# Patient Record
Sex: FEMALE | Race: BLACK OR AFRICAN AMERICAN
[De-identification: names, ages, dates, MRNs, and addresses within clinical notes are randomized per-mention and may not be internally consistent; named-entity substitution may affect disease eponyms.]

---

## 2019-02-05 ENCOUNTER — HOSPITAL ENCOUNTER (EMERGENCY)
Dept: HOSPITAL 54 - ER | Age: 54
Discharge: HOME | End: 2019-02-05
Payer: COMMERCIAL

## 2019-02-05 VITALS — DIASTOLIC BLOOD PRESSURE: 90 MMHG | SYSTOLIC BLOOD PRESSURE: 140 MMHG

## 2019-02-05 VITALS — BODY MASS INDEX: 37.8 KG/M2 | WEIGHT: 270 LBS | HEIGHT: 71 IN

## 2019-02-05 DIAGNOSIS — R09.1: Primary | ICD-10-CM

## 2019-02-05 DIAGNOSIS — I10: ICD-10-CM

## 2019-02-05 DIAGNOSIS — M19.90: ICD-10-CM

## 2019-02-05 LAB
ALBUMIN SERPL BCP-MCNC: 3.7 G/DL (ref 3.4–5)
ALP SERPL-CCNC: 81 U/L (ref 46–116)
ALT SERPL W P-5'-P-CCNC: 22 U/L (ref 12–78)
AST SERPL W P-5'-P-CCNC: 15 U/L (ref 15–37)
BASOPHILS # BLD AUTO: 0.1 /CMM (ref 0–0.2)
BASOPHILS NFR BLD AUTO: 1.5 % (ref 0–2)
BILIRUB DIRECT SERPL-MCNC: 0.1 MG/DL (ref 0–0.2)
BILIRUB SERPL-MCNC: 0.2 MG/DL (ref 0.2–1)
BUN SERPL-MCNC: 20 MG/DL (ref 7–18)
CALCIUM SERPL-MCNC: 9 MG/DL (ref 8.5–10.1)
CHLORIDE SERPL-SCNC: 109 MMOL/L (ref 98–107)
CO2 SERPL-SCNC: 29 MMOL/L (ref 21–32)
CREAT SERPL-MCNC: 1.1 MG/DL (ref 0.6–1.3)
D DIMER PPP FEU-MCNC: 0.45 MG/L(FEU (ref 0.17–0.5)
EOSINOPHIL NFR BLD AUTO: 3.4 % (ref 0–6)
GLUCOSE SERPL-MCNC: 96 MG/DL (ref 74–106)
HCT VFR BLD AUTO: 40 % (ref 33–45)
HGB BLD-MCNC: 13.3 G/DL (ref 11.5–14.8)
LYMPHOCYTES NFR BLD AUTO: 1.6 /CMM (ref 0.8–4.8)
LYMPHOCYTES NFR BLD AUTO: 20.5 % (ref 20–44)
MCHC RBC AUTO-ENTMCNC: 34 G/DL (ref 31–36)
MCV RBC AUTO: 91 FL (ref 82–100)
MONOCYTES NFR BLD AUTO: 0.5 /CMM (ref 0.1–1.3)
MONOCYTES NFR BLD AUTO: 6.5 % (ref 2–12)
NEUTROPHILS # BLD AUTO: 5.5 /CMM (ref 1.8–8.9)
NEUTROPHILS NFR BLD AUTO: 68.1 % (ref 43–81)
PLATELET # BLD AUTO: 209 /CMM (ref 150–450)
POTASSIUM SERPL-SCNC: 4.3 MMOL/L (ref 3.5–5.1)
PROT SERPL-MCNC: 7 G/DL (ref 6.4–8.2)
RBC # BLD AUTO: 4.35 MIL/UL (ref 4–5.2)
SODIUM SERPL-SCNC: 145 MMOL/L (ref 136–145)
WBC NRBC COR # BLD AUTO: 8 K/UL (ref 4.3–11)

## 2019-02-05 PROCEDURE — 36415 COLL VENOUS BLD VENIPUNCTURE: CPT

## 2019-02-05 PROCEDURE — 96375 TX/PRO/DX INJ NEW DRUG ADDON: CPT

## 2019-02-05 PROCEDURE — 71275 CT ANGIOGRAPHY CHEST: CPT

## 2019-02-05 PROCEDURE — 93005 ELECTROCARDIOGRAM TRACING: CPT

## 2019-02-05 PROCEDURE — 85730 THROMBOPLASTIN TIME PARTIAL: CPT

## 2019-02-05 PROCEDURE — 80048 BASIC METABOLIC PNL TOTAL CA: CPT

## 2019-02-05 PROCEDURE — 96374 THER/PROPH/DIAG INJ IV PUSH: CPT

## 2019-02-05 PROCEDURE — 84484 ASSAY OF TROPONIN QUANT: CPT

## 2019-02-05 PROCEDURE — 80076 HEPATIC FUNCTION PANEL: CPT

## 2019-02-05 PROCEDURE — 85378 FIBRIN DEGRADE SEMIQUANT: CPT

## 2019-02-05 PROCEDURE — 99284 EMERGENCY DEPT VISIT MOD MDM: CPT

## 2019-02-05 PROCEDURE — 85025 COMPLETE CBC W/AUTO DIFF WBC: CPT

## 2019-02-05 PROCEDURE — 71045 X-RAY EXAM CHEST 1 VIEW: CPT

## 2019-02-05 NOTE — NUR
PT [id number] C/O LL RIB PAIN x 2 WKS WORST TODAY, NON RADIATING. 9/10 PS, PT IS 
AAOX4, NOT IN RESPIRATORY DISTRESS, VS STABLE, KEPT RESTED AND COMFORTABLE.

## 2019-03-16 ENCOUNTER — HOSPITAL ENCOUNTER (EMERGENCY)
Dept: HOSPITAL 54 - ER | Age: 54
Discharge: HOME | End: 2019-03-16
Payer: COMMERCIAL

## 2019-03-16 VITALS — SYSTOLIC BLOOD PRESSURE: 162 MMHG | DIASTOLIC BLOOD PRESSURE: 105 MMHG

## 2019-03-16 VITALS — WEIGHT: 165 LBS | BODY MASS INDEX: 23.1 KG/M2 | HEIGHT: 71 IN

## 2019-03-16 DIAGNOSIS — I10: Primary | ICD-10-CM

## 2019-03-16 DIAGNOSIS — M19.90: ICD-10-CM

## 2019-03-16 DIAGNOSIS — M25.461: ICD-10-CM

## 2019-03-16 DIAGNOSIS — R51: ICD-10-CM

## 2019-03-16 LAB
ALBUMIN SERPL BCP-MCNC: 3.3 G/DL (ref 3.4–5)
ALP SERPL-CCNC: 65 U/L (ref 46–116)
ALT SERPL W P-5'-P-CCNC: 25 U/L (ref 12–78)
AST SERPL W P-5'-P-CCNC: 16 U/L (ref 15–37)
BASOPHILS # BLD AUTO: 0.1 /CMM (ref 0–0.2)
BASOPHILS NFR BLD AUTO: 1.1 % (ref 0–2)
BILIRUB DIRECT SERPL-MCNC: 0.1 MG/DL (ref 0–0.2)
BILIRUB SERPL-MCNC: 0.2 MG/DL (ref 0.2–1)
BUN SERPL-MCNC: 16 MG/DL (ref 7–18)
CALCIUM SERPL-MCNC: 9.4 MG/DL (ref 8.5–10.1)
CHLORIDE SERPL-SCNC: 103 MMOL/L (ref 98–107)
CO2 SERPL-SCNC: 31 MMOL/L (ref 21–32)
CREAT SERPL-MCNC: 1.1 MG/DL (ref 0.6–1.3)
EOSINOPHIL NFR BLD AUTO: 2.4 % (ref 0–6)
GLUCOSE SERPL-MCNC: 108 MG/DL (ref 74–106)
HCT VFR BLD AUTO: 41 % (ref 33–45)
HGB BLD-MCNC: 13.6 G/DL (ref 11.5–14.8)
LYMPHOCYTES NFR BLD AUTO: 2.1 /CMM (ref 0.8–4.8)
LYMPHOCYTES NFR BLD AUTO: 22.3 % (ref 20–44)
MCHC RBC AUTO-ENTMCNC: 33 G/DL (ref 31–36)
MCV RBC AUTO: 91 FL (ref 82–100)
MONOCYTES NFR BLD AUTO: 0.6 /CMM (ref 0.1–1.3)
MONOCYTES NFR BLD AUTO: 6.8 % (ref 2–12)
NEUTROPHILS # BLD AUTO: 6.4 /CMM (ref 1.8–8.9)
NEUTROPHILS NFR BLD AUTO: 67.4 % (ref 43–81)
PLATELET # BLD AUTO: 229 /CMM (ref 150–450)
POTASSIUM SERPL-SCNC: 3.6 MMOL/L (ref 3.5–5.1)
PROT SERPL-MCNC: 6.8 G/DL (ref 6.4–8.2)
RBC # BLD AUTO: 4.47 MIL/UL (ref 4–5.2)
SODIUM SERPL-SCNC: 138 MMOL/L (ref 136–145)
WBC NRBC COR # BLD AUTO: 9.5 K/UL (ref 4.3–11)

## 2019-03-16 PROCEDURE — 93005 ELECTROCARDIOGRAM TRACING: CPT

## 2019-03-16 PROCEDURE — 36415 COLL VENOUS BLD VENIPUNCTURE: CPT

## 2019-03-16 PROCEDURE — 80076 HEPATIC FUNCTION PANEL: CPT

## 2019-03-16 PROCEDURE — 73564 X-RAY EXAM KNEE 4 OR MORE: CPT

## 2019-03-16 PROCEDURE — 80048 BASIC METABOLIC PNL TOTAL CA: CPT

## 2019-03-16 PROCEDURE — 71045 X-RAY EXAM CHEST 1 VIEW: CPT

## 2019-03-16 PROCEDURE — 85025 COMPLETE CBC W/AUTO DIFF WBC: CPT

## 2019-03-16 PROCEDURE — 99284 EMERGENCY DEPT VISIT MOD MDM: CPT

## 2019-03-16 PROCEDURE — 96374 THER/PROPH/DIAG INJ IV PUSH: CPT

## 2019-03-16 PROCEDURE — 70450 CT HEAD/BRAIN W/O DYE: CPT

## 2019-10-03 ENCOUNTER — HOSPITAL ENCOUNTER (EMERGENCY)
Dept: HOSPITAL 54 - ER | Age: 54
Discharge: PSYCHIATRIC OR SUBSTANCE USE FACILITY | End: 2019-10-03
Payer: COMMERCIAL

## 2019-10-03 VITALS — HEIGHT: 71 IN | BODY MASS INDEX: 35.28 KG/M2 | WEIGHT: 252 LBS

## 2019-10-03 VITALS — SYSTOLIC BLOOD PRESSURE: 177 MMHG | DIASTOLIC BLOOD PRESSURE: 102 MMHG

## 2019-10-03 DIAGNOSIS — M19.90: ICD-10-CM

## 2019-10-03 DIAGNOSIS — I10: ICD-10-CM

## 2019-10-03 DIAGNOSIS — F32.9: Primary | ICD-10-CM

## 2019-10-03 LAB
ALBUMIN SERPL BCP-MCNC: 3.5 G/DL (ref 3.4–5)
ALP SERPL-CCNC: 68 U/L (ref 46–116)
ALT SERPL W P-5'-P-CCNC: 24 U/L (ref 12–78)
APAP SERPL-MCNC: 0 UG/ML (ref 10–30)
AST SERPL W P-5'-P-CCNC: 15 U/L (ref 15–37)
BASOPHILS # BLD AUTO: 0.1 /CMM (ref 0–0.2)
BASOPHILS NFR BLD AUTO: 1.2 % (ref 0–2)
BILIRUB DIRECT SERPL-MCNC: 0.1 MG/DL (ref 0–0.2)
BILIRUB SERPL-MCNC: 0.3 MG/DL (ref 0.2–1)
BUN SERPL-MCNC: 15 MG/DL (ref 7–18)
CALCIUM SERPL-MCNC: 9 MG/DL (ref 8.5–10.1)
CHLORIDE SERPL-SCNC: 108 MMOL/L (ref 98–107)
CO2 SERPL-SCNC: 28 MMOL/L (ref 21–32)
CREAT SERPL-MCNC: 0.8 MG/DL (ref 0.6–1.3)
EOSINOPHIL NFR BLD AUTO: 4.3 % (ref 0–6)
ETHANOL SERPL-MCNC: < 3 MG/DL (ref 0–0)
GLUCOSE SERPL-MCNC: 99 MG/DL (ref 74–106)
GLUCOSE UR STRIP-MCNC: (no result) MG/DL
HCT VFR BLD AUTO: 40 % (ref 33–45)
HGB BLD-MCNC: 13.2 G/DL (ref 11.5–14.8)
LYMPHOCYTES NFR BLD AUTO: 1.8 /CMM (ref 0.8–4.8)
LYMPHOCYTES NFR BLD AUTO: 20.9 % (ref 20–44)
MCHC RBC AUTO-ENTMCNC: 33 G/DL (ref 31–36)
MCV RBC AUTO: 93 FL (ref 82–100)
MONOCYTES NFR BLD AUTO: 0.6 /CMM (ref 0.1–1.3)
MONOCYTES NFR BLD AUTO: 7 % (ref 2–12)
NEUTROPHILS # BLD AUTO: 5.8 /CMM (ref 1.8–8.9)
NEUTROPHILS NFR BLD AUTO: 66.6 % (ref 43–81)
PH UR STRIP: 6.5 [PH] (ref 5–8)
PLATELET # BLD AUTO: 194 /CMM (ref 150–450)
POTASSIUM SERPL-SCNC: 3.9 MMOL/L (ref 3.5–5.1)
PROT SERPL-MCNC: 6.8 G/DL (ref 6.4–8.2)
RBC # BLD AUTO: 4.31 MIL/UL (ref 4–5.2)
RBC #/AREA URNS HPF: (no result) /HPF (ref 0–2)
SALICYLATES SERPL-MCNC: 4.1 MG/DL (ref 2.8–20)
SODIUM SERPL-SCNC: 142 MMOL/L (ref 136–145)
UROBILINOGEN UR STRIP-MCNC: 0.2 EU/DL
WBC #/AREA URNS HPF: (no result) /HPF (ref 0–3)
WBC NRBC COR # BLD AUTO: 8.7 K/UL (ref 4.3–11)

## 2019-10-03 PROCEDURE — 81001 URINALYSIS AUTO W/SCOPE: CPT

## 2019-10-03 PROCEDURE — 80305 DRUG TEST PRSMV DIR OPT OBS: CPT

## 2019-10-03 PROCEDURE — G0480 DRUG TEST DEF 1-7 CLASSES: HCPCS

## 2019-10-03 PROCEDURE — 80048 BASIC METABOLIC PNL TOTAL CA: CPT

## 2019-10-03 PROCEDURE — 85025 COMPLETE CBC W/AUTO DIFF WBC: CPT

## 2019-10-03 PROCEDURE — 84703 CHORIONIC GONADOTROPIN ASSAY: CPT

## 2019-10-03 PROCEDURE — 80307 DRUG TEST PRSMV CHEM ANLYZR: CPT

## 2019-10-03 PROCEDURE — 36415 COLL VENOUS BLD VENIPUNCTURE: CPT

## 2019-10-03 PROCEDURE — 99285 EMERGENCY DEPT VISIT HI MDM: CPT

## 2019-10-03 PROCEDURE — 80076 HEPATIC FUNCTION PANEL: CPT

## 2019-10-03 PROCEDURE — 80329 ANALGESICS NON-OPIOID 1 OR 2: CPT

## 2019-10-03 RX ADMIN — CAPTOPRIL ONE MG: 12.5 TABLET ORAL at 13:28

## 2019-10-03 RX ADMIN — CAPTOPRIL ONE MG: 12.5 TABLET ORAL at 14:18

## 2019-10-03 NOTE — NUR
ANGELINA faxed requested report to Morgan in intake and called him to confirm he received it. Morgan 
did receive the fax and informed ANGELINA he has forwarded the report to  [facility] charge SHANDA Díaz. 
He will contact CRIS Díaz and call ANGELINA back.

## 2019-10-03 NOTE — NUR
Social service consult requested by Dr. Mike for suicidal ideations with no plan at 
this time. Pt. is a 53 year old female with history of hypertension and depression referred 
here by her outpatient psychiatrist due to worsening depression and suicidal ideation. 
Reports she has been taking her medications as prescribed however she has had a series of 
life events recently that it made her more depressed and she is hearing voices telling her 
to kill herself. Reports that her ex- just  and that her kids were taken away 
from her. ANGELINA met with the pt. bedside. Pt. is alert and oriented x 4. Pt. has a sad affect. 
Pt. resides alone at [address] in [place]. Pt. sates she has diagnosis of 
Depression and Anxiety and takes medication. Pt. stats she is having suicidal thoughts and 
hearing voices telling her to kill herself. Pt. was hospitalized for suicidal ideations at 
[facility] 3 years ago. Pt. states he ex-  recently and her twin 17 
year old boys are in foster care for the past 3 years. Pt. is feeling very depressed and is 
requesting voluntary psychiatric hospitalization. 



ANGELINA contacted Rufino at [facility] [phone] requesting bed availability. Rufino informed ANGELINA 
to fax clinicals to [phone] and he will have a bed for the pt.

## 2019-10-03 NOTE — NUR
SW received a callback from [place] in intake requesting for a pregnancy test and latest blood 
pressure reading.

## 2019-10-03 NOTE — NUR
"C/O DEPRESSION X 1 MONTH. SI, NO ACTIVE PLAN AT THIS TIME." PT AAOX4, -SOB, 
NAD NOTED, VSS, PENDING MD HURLEY, SITTER AT BEDSIDE FOR SAFETY

## 2019-10-03 NOTE — NUR
ANGELINA received a callback from Morgan in intake at [facility]. Pt. has been accepted under psychiatrist 
Dr. Koenig and internist Dr. Echeverria. Report needs to be called in to [phone], charge 
SHANDA Morton. ANGELINA informed ED SHANDA Sullivan with aforementioned information.

## 2019-10-03 NOTE — NUR
PT LEFT [facility]IA [facility] TO [facility]; PT LEFT IN STABLE CONDITION, VSS, 
NAD NOTED. REPORT GIVEN TO AMB STAFF

## 2019-10-03 NOTE — NUR
ANGELINA received a call from Morgan at [facility] intake informing ANGELINA that as soon as he receives the 
clinicals, he will forward it to charge RN at [facility] and follow up with ANGELINA. ANGELINA informed Morgan 
that clinicals were faxed to [phone].

## 2019-11-10 ENCOUNTER — HOSPITAL ENCOUNTER (EMERGENCY)
Dept: HOSPITAL 54 - ER | Age: 54
Discharge: HOME | End: 2019-11-10
Payer: COMMERCIAL

## 2019-11-10 VITALS — SYSTOLIC BLOOD PRESSURE: 156 MMHG | DIASTOLIC BLOOD PRESSURE: 100 MMHG

## 2019-11-10 VITALS — BODY MASS INDEX: 36.4 KG/M2 | WEIGHT: 260 LBS | HEIGHT: 71 IN

## 2019-11-10 DIAGNOSIS — Z76.0: ICD-10-CM

## 2019-11-10 DIAGNOSIS — I50.9: ICD-10-CM

## 2019-11-10 DIAGNOSIS — Z79.899: ICD-10-CM

## 2019-11-10 DIAGNOSIS — J45.909: Primary | ICD-10-CM

## 2019-11-10 DIAGNOSIS — I11.0: ICD-10-CM

## 2019-11-10 DIAGNOSIS — Z86.711: ICD-10-CM

## 2019-11-10 DIAGNOSIS — Z86.718: ICD-10-CM

## 2019-11-10 PROCEDURE — 96374 THER/PROPH/DIAG INJ IV PUSH: CPT

## 2019-11-10 PROCEDURE — 99285 EMERGENCY DEPT VISIT HI MDM: CPT

## 2019-11-10 PROCEDURE — 94644 CONT INHLJ TX 1ST HOUR: CPT

## 2019-11-10 PROCEDURE — 96375 TX/PRO/DX INJ NEW DRUG ADDON: CPT

## 2019-11-10 PROCEDURE — 71045 X-RAY EXAM CHEST 1 VIEW: CPT

## 2019-11-10 PROCEDURE — 93005 ELECTROCARDIOGRAM TRACING: CPT

## 2019-11-10 NOTE — NUR
Patient heplock removed noted cath intact no edema ,no pain 

noted patient agrees to call pmd in 2 days verbalized understanding

## 2019-11-22 ENCOUNTER — HOSPITAL ENCOUNTER (EMERGENCY)
Dept: HOSPITAL 54 - ER | Age: 54
Discharge: HOME | End: 2019-11-22
Payer: COMMERCIAL

## 2019-11-22 VITALS — HEIGHT: 67 IN | WEIGHT: 259 LBS | BODY MASS INDEX: 40.65 KG/M2

## 2019-11-22 VITALS — SYSTOLIC BLOOD PRESSURE: 192 MMHG | DIASTOLIC BLOOD PRESSURE: 129 MMHG

## 2019-11-22 DIAGNOSIS — R06.02: ICD-10-CM

## 2019-11-22 DIAGNOSIS — M79.18: Primary | ICD-10-CM

## 2019-11-22 DIAGNOSIS — Z79.899: ICD-10-CM

## 2019-11-22 DIAGNOSIS — I10: ICD-10-CM

## 2019-11-22 DIAGNOSIS — E78.5: ICD-10-CM

## 2019-11-22 DIAGNOSIS — R07.81: ICD-10-CM

## 2019-11-22 DIAGNOSIS — F41.9: ICD-10-CM

## 2019-11-22 DIAGNOSIS — J45.909: ICD-10-CM

## 2019-11-22 DIAGNOSIS — F17.200: ICD-10-CM

## 2019-11-22 DIAGNOSIS — F32.9: ICD-10-CM

## 2020-08-17 ENCOUNTER — HOSPITAL ENCOUNTER (INPATIENT)
Dept: HOSPITAL 54 - ER | Age: 55
LOS: 2 days | Discharge: HOME HEALTH | DRG: 422 | End: 2020-08-19
Attending: INTERNAL MEDICINE | Admitting: INTERNAL MEDICINE
Payer: COMMERCIAL

## 2020-08-17 VITALS — BODY MASS INDEX: 33.27 KG/M2 | HEIGHT: 66 IN | WEIGHT: 207 LBS

## 2020-08-17 VITALS — DIASTOLIC BLOOD PRESSURE: 57 MMHG | SYSTOLIC BLOOD PRESSURE: 109 MMHG

## 2020-08-17 DIAGNOSIS — N13.9: ICD-10-CM

## 2020-08-17 DIAGNOSIS — S82.831A: ICD-10-CM

## 2020-08-17 DIAGNOSIS — N17.0: ICD-10-CM
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
## 2020-08-17 DIAGNOSIS — E86.0: ICD-10-CM

## 2020-08-17 DIAGNOSIS — M77.30: ICD-10-CM

## 2020-08-17 DIAGNOSIS — Z72.0: ICD-10-CM

## 2020-08-17 DIAGNOSIS — Z79.899: ICD-10-CM

## 2020-08-17 DIAGNOSIS — F32.9: ICD-10-CM

## 2020-08-17 DIAGNOSIS — J44.9: ICD-10-CM

## 2020-08-17 DIAGNOSIS — E86.9: Primary | ICD-10-CM
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
## 2020-08-17 DIAGNOSIS — I10: ICD-10-CM

## 2020-08-17 DIAGNOSIS — F41.9: ICD-10-CM

## 2020-08-17 DIAGNOSIS — M19.90: ICD-10-CM

## 2020-08-17 DIAGNOSIS — D72.829: ICD-10-CM
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
## 2020-08-17 LAB
ALBUMIN SERPL BCP-MCNC: 3.8 G/DL (ref 3.4–5)
ALP SERPL-CCNC: 66 U/L (ref 46–116)
ALT SERPL W P-5'-P-CCNC: 35 U/L (ref 12–78)
APAP SERPL-MCNC: < 2 UG/ML (ref 10–30)
APPEARANCE UR: (no result)
AST SERPL W P-5'-P-CCNC: 23 U/L (ref 15–37)
BASOPHILS # BLD AUTO: 0.1 /CMM (ref 0–0.2)
BASOPHILS NFR BLD AUTO: 1.1 % (ref 0–2)
BILIRUB DIRECT SERPL-MCNC: 0.1 MG/DL (ref 0–0.2)
BILIRUB SERPL-MCNC: 0.5 MG/DL (ref 0.2–1)
BILIRUB UR QL STRIP: (no result)
BUN SERPL-MCNC: 44 MG/DL (ref 7–18)
CALCIUM SERPL-MCNC: 9.8 MG/DL (ref 8.5–10.1)
CHLORIDE SERPL-SCNC: 100 MMOL/L (ref 98–107)
CHOLEST SERPL-MCNC: 125 MG/DL (ref ?–200)
CO2 SERPL-SCNC: 23 MMOL/L (ref 21–32)
COLOR UR: YELLOW
CREAT SERPL-MCNC: 8.7 MG/DL (ref 0.6–1.3)
EOSINOPHIL NFR BLD AUTO: 1.2 % (ref 0–6)
ETHANOL SERPL-MCNC: < 3 MG/DL (ref 0–0)
GLUCOSE SERPL-MCNC: 104 MG/DL (ref 74–106)
GLUCOSE UR STRIP-MCNC: NEGATIVE MG/DL
HCT VFR BLD AUTO: 37 % (ref 33–45)
HDLC SERPL-MCNC: 45 MG/DL (ref 40–60)
HGB BLD-MCNC: 12.2 G/DL (ref 11.5–14.8)
HGB UR QL STRIP: (no result) ERY/UL
IRON SERPL-MCNC: 39 UG/DL (ref 50–175)
KETONES UR STRIP-MCNC: (no result) MG/DL
LDLC SERPL DIRECT ASSAY-MCNC: 52 MG/DL (ref 0–99)
LEUKOCYTE ESTERASE UR QL STRIP: NEGATIVE
LYMPHOCYTES NFR BLD AUTO: 1.7 /CMM (ref 0.8–4.8)
LYMPHOCYTES NFR BLD AUTO: 13.2 % (ref 20–44)
MAGNESIUM SERPL-MCNC: 2.1 MG/DL (ref 1.8–2.4)
MAGNESIUM SERPL-MCNC: 2.2 MG/DL (ref 1.8–2.4)
MCHC RBC AUTO-ENTMCNC: 33 G/DL (ref 31–36)
MCV RBC AUTO: 93 FL (ref 82–100)
MONOCYTES NFR BLD AUTO: 1 /CMM (ref 0.1–1.3)
MONOCYTES NFR BLD AUTO: 8.1 % (ref 2–12)
NEUTROPHILS # BLD AUTO: 9.9 /CMM (ref 1.8–8.9)
NEUTROPHILS NFR BLD AUTO: 76.4 % (ref 43–81)
NITRITE UR QL STRIP: NEGATIVE
NT-PROBNP SERPL-MCNC: 458 PG/ML (ref 0–125)
PH UR STRIP: 5.5 [PH] (ref 5–8)
PHOSPHATE SERPL-MCNC: 5.3 MG/DL (ref 2.5–4.9)
PLATELET # BLD AUTO: 209 /CMM (ref 150–450)
POTASSIUM SERPL-SCNC: 4 MMOL/L (ref 3.5–5.1)
PROT SERPL-MCNC: 9.6 G/DL (ref 6.4–8.2)
PROT UR QL STRIP: (no result) MG/DL
RBC # BLD AUTO: 4 MIL/UL (ref 4–5.2)
RBC #/AREA URNS HPF: (no result) /HPF (ref 0–2)
SALICYLATES SERPL-MCNC: 6.9 MG/DL (ref 2.8–20)
SODIUM SERPL-SCNC: 136 MMOL/L (ref 136–145)
TIBC SERPL-MCNC: 278 UG/DL (ref 250–450)
TRIGL SERPL-MCNC: 135 MG/DL (ref 30–150)
TSH SERPL DL<=0.005 MIU/L-ACNC: 0.28 UIU/ML (ref 0.36–3.74)
UROBILINOGEN UR STRIP-MCNC: 0.2 EU/DL
WBC #/AREA URNS HPF: (no result) /HPF (ref 0–3)
WBC NRBC COR # BLD AUTO: 12.9 K/UL (ref 4.3–11)

## 2020-08-17 PROCEDURE — G0480 DRUG TEST DEF 1-7 CLASSES: HCPCS

## 2020-08-17 PROCEDURE — G0378 HOSPITAL OBSERVATION PER HR: HCPCS

## 2020-08-17 RX ADMIN — Medication PRN EACH: at 12:02

## 2020-08-17 RX ADMIN — Medication PRN EACH: at 20:43

## 2020-08-17 RX ADMIN — SODIUM CHLORIDE PRN MLS/HR: 9 INJECTION, SOLUTION INTRAVENOUS at 12:06

## 2020-08-17 NOTE — NUR
TELE/RN  NOTE



THE PATIENT IS ALERT AND ORIENTED X4. IN ROOM AIR AND SATURATION IS AT 97%. DENIES SOB. 
RESPIRATION REGULAR AND UNLABORED. DENIES PAIN. THE PATIENT IS IN NO APPARENT DISTRESS. LEFT 
WRIST G 18 PATENT AN SALINE LOCKED. RIGHT WRIST G 18 PATENT AND NS INFUSING AT 100ML/HR. NO 
S/S INFILTRATION NOTED EXTERNAL TELE BOX READING IS SRDamon JOSÉEY CATH PRESENT AND NOTED CLEAR, 
YELLOW COLOR URINE. NO BLADDER DISTENSION NOTED. BED LOW AND LOCKED. SIDE RAILS UP X3. WILL 
ENDORSE TO NIGHT SHIFT.

-------------------------------------------------------------------------------

Addendum: 08/17/20 at 1832 by JEFFREY MONTGOMERY RN

-------------------------------------------------------------------------------

RN  NOTE



NOTED RIGHT ANKLE WITH SHORT LEFT SPLINT. DENIES PAIN. ABLE TO MOVE TOES FREELY. TOES WARN 
TO TOUCH. DENIES NUMBNESS, TINGLING.

## 2020-08-17 NOTE — NUR
TELE/RN  NOTE



THE PATIENT IS RECEIVED ON A GURNEY. ASSISTED THE PATIENT TO BED. THE PATIENT IS ALERT AND 
ORIENTED X4. DENIES SOB. RESPIRATION REGULAR AND UNLABORED. THE PATIENT IS IN ROOM AIR. 
NOTED RIGHT ANKLE WITH SHORT LEFT SPLINT. DENIES PAIN. ABLE TO MOVE TOES FREELY. TOES WARN 
TO TOUCH. DENIES NUMBNESS, TINGLING. LEWIS CATH PRESENT. LEFT WRIST G 18 AND RIGHT WRIST G 
18 BOTH PATENT AND SALINE LOCKED. BED LOW AND LOCKED. SIDE RAILS UP X3. CALL LIGHT WITHIN 
REACH. WILL CONTINUE TO MONITOR.

## 2020-08-17 NOTE — NUR
pt noted w/ swollen r ankle and c/o pain. reported fall three days ago.MD made 
aware w/ an order for R ankle x.ray

## 2020-08-17 NOTE — NUR
TELE/RN   NOTE



THE PATIENT REFUSED ORTHOSTATIC BLOOD PRESSURE TO BE CHECKED DESPITE EXPLAINING RISKS AND 
BENEFITS.

## 2020-08-17 NOTE — NUR
R ANKLE POSTERIOR SHORT LEG SPLINT APPLIED BY EMT, R LEG ELEVATED , KEPT PT 
COMFORTABLE .  WILL CONT TO MONITOR

## 2020-08-17 NOTE — NUR
Patient transferred to room 325-2 via acls protocol, endorsed to Krystian LAND. 
Patient in stable condition.

## 2020-08-17 NOTE — NUR
TELE/RN  NOTES



RECEIVED PATIENT  ALERT AND ORIENTED X4. IN ROOM AIR AND SATURATION IS AT 98%. DENIES 
SOB.BREATHING EVEN AND UNLABORED,  REGULAR AND UNLABORED. DENIES PAIN. THE PATIENT IS IN NO 
APPARENT DISTRESS. LEFT WRIST G 18 PATENT AN SALINE LOCKED. RIGHT WRIST G 18 PATENT AND NS 
INFUSING AT 100ML/HR. NO S/S INFILTRATION NOTED EXTERNAL TELE BOX READING IS SR 70s. LEWIS 
CATH INTACT AND PATENT DRAINING TO A YELLOW COLOR URINE. NO BLADDER DISTENSION NOTED. BED IN 
LOW AND LOCKED. CALL LIGHT WITHIN EASY REACH.SIDE RAILS UP X3. WILL ENDORSE TO AM SHIFT FOR 
CONTINUITY OF CARE.

## 2020-08-17 NOTE — NUR
mabry cath insserted w/ >50ml urine out put. clear, yellow

urine collected and sent to lab

covid swab collected and sent to lab

## 2020-08-18 VITALS — SYSTOLIC BLOOD PRESSURE: 123 MMHG | DIASTOLIC BLOOD PRESSURE: 82 MMHG

## 2020-08-18 VITALS — SYSTOLIC BLOOD PRESSURE: 119 MMHG | DIASTOLIC BLOOD PRESSURE: 84 MMHG

## 2020-08-18 VITALS — DIASTOLIC BLOOD PRESSURE: 75 MMHG | SYSTOLIC BLOOD PRESSURE: 111 MMHG

## 2020-08-18 VITALS — DIASTOLIC BLOOD PRESSURE: 75 MMHG | SYSTOLIC BLOOD PRESSURE: 117 MMHG

## 2020-08-18 VITALS — SYSTOLIC BLOOD PRESSURE: 124 MMHG | DIASTOLIC BLOOD PRESSURE: 80 MMHG

## 2020-08-18 VITALS — SYSTOLIC BLOOD PRESSURE: 123 MMHG | DIASTOLIC BLOOD PRESSURE: 90 MMHG

## 2020-08-18 VITALS — SYSTOLIC BLOOD PRESSURE: 123 MMHG | DIASTOLIC BLOOD PRESSURE: 88 MMHG

## 2020-08-18 LAB
ALBUMIN SERPL BCP-MCNC: 3 G/DL (ref 3.4–5)
ALP SERPL-CCNC: 54 U/L (ref 46–116)
ALT SERPL W P-5'-P-CCNC: 24 U/L (ref 12–78)
APPEARANCE UR: CLEAR
AST SERPL W P-5'-P-CCNC: 19 U/L (ref 15–37)
BASOPHILS # BLD AUTO: 0.1 /CMM (ref 0–0.2)
BASOPHILS NFR BLD AUTO: 1 % (ref 0–2)
BILIRUB SERPL-MCNC: 0.3 MG/DL (ref 0.2–1)
BILIRUB UR QL STRIP: NEGATIVE
BUN SERPL-MCNC: 44 MG/DL (ref 7–18)
CALCIUM SERPL-MCNC: 8.9 MG/DL (ref 8.5–10.1)
CHLORIDE SERPL-SCNC: 108 MMOL/L (ref 98–107)
CK SERPL-CCNC: 352 U/L (ref 26–192)
CO2 SERPL-SCNC: 20 MMOL/L (ref 21–32)
COLOR UR: YELLOW
CREAT SERPL-MCNC: 3.4 MG/DL (ref 0.6–1.3)
CREAT UR-MCNC: 255.4 MG/DL (ref 30–125)
EOSINOPHIL NFR BLD AUTO: 4.2 % (ref 0–6)
GLUCOSE SERPL-MCNC: 92 MG/DL (ref 74–106)
GLUCOSE UR STRIP-MCNC: NEGATIVE MG/DL
HCT VFR BLD AUTO: 35 % (ref 33–45)
HGB BLD-MCNC: 11.4 G/DL (ref 11.5–14.8)
HGB UR QL STRIP: (no result) ERY/UL
KETONES UR STRIP-MCNC: NEGATIVE MG/DL
LEUKOCYTE ESTERASE UR QL STRIP: NEGATIVE
LYMPHOCYTES NFR BLD AUTO: 1.9 /CMM (ref 0.8–4.8)
LYMPHOCYTES NFR BLD AUTO: 25.7 % (ref 20–44)
MAGNESIUM SERPL-MCNC: 2 MG/DL (ref 1.8–2.4)
MCHC RBC AUTO-ENTMCNC: 33 G/DL (ref 31–36)
MCV RBC AUTO: 94 FL (ref 82–100)
MONOCYTES NFR BLD AUTO: 0.7 /CMM (ref 0.1–1.3)
MONOCYTES NFR BLD AUTO: 9.4 % (ref 2–12)
NEUTROPHILS # BLD AUTO: 4.5 /CMM (ref 1.8–8.9)
NEUTROPHILS NFR BLD AUTO: 59.7 % (ref 43–81)
NITRITE UR QL STRIP: NEGATIVE
PH UR STRIP: 6 [PH] (ref 5–8)
PHOSPHATE SERPL-MCNC: 4 MG/DL (ref 2.5–4.9)
PLATELET # BLD AUTO: 187 /CMM (ref 150–450)
POTASSIUM SERPL-SCNC: 3.5 MMOL/L (ref 3.5–5.1)
PROT SERPL-MCNC: 6.4 G/DL (ref 6.4–8.2)
PROT UR QL STRIP: NEGATIVE MG/DL
PROT UR-MCNC: 43.9 MG/DL (ref 0–11.9)
RBC # BLD AUTO: 3.68 MIL/UL (ref 4–5.2)
SODIUM SERPL-SCNC: 140 MMOL/L (ref 136–145)
SODIUM UR-SCNC: 47 MMOL/L (ref 40–220)
UROBILINOGEN UR STRIP-MCNC: 0.2 EU/DL
WBC NRBC COR # BLD AUTO: 7.5 K/UL (ref 4.3–11)

## 2020-08-18 RX ADMIN — LABETALOL HCL SCH MG: 100 TABLET, FILM COATED ORAL at 21:26

## 2020-08-18 RX ADMIN — SODIUM CHLORIDE PRN MLS/HR: 9 INJECTION, SOLUTION INTRAVENOUS at 03:17

## 2020-08-18 RX ADMIN — Medication SCH MG: at 21:02

## 2020-08-18 RX ADMIN — Medication PRN EACH: at 19:35

## 2020-08-18 RX ADMIN — Medication PRN EACH: at 12:11

## 2020-08-18 RX ADMIN — BACLOFEN SCH MG: 10 TABLET ORAL at 17:21

## 2020-08-18 RX ADMIN — Medication PRN EACH: at 05:44

## 2020-08-18 RX ADMIN — SODIUM CHLORIDE PRN MLS/HR: 9 INJECTION, SOLUTION INTRAVENOUS at 12:16

## 2020-08-18 RX ADMIN — GABAPENTIN SCH MG: 300 CAPSULE ORAL at 17:21

## 2020-08-18 RX ADMIN — CLONIDINE HYDROCHLORIDE SCH MG: 0.1 TABLET ORAL at 17:22

## 2020-08-18 RX ADMIN — Medication SCH MG: at 14:02

## 2020-08-18 RX ADMIN — BENZONATATE SCH MG: 100 CAPSULE ORAL at 12:05

## 2020-08-18 RX ADMIN — BENZONATATE SCH MG: 100 CAPSULE ORAL at 17:21

## 2020-08-18 RX ADMIN — ESCITALOPRAM OXALATE SCH MG: 10 TABLET, FILM COATED ORAL at 12:05

## 2020-08-18 NOTE — NUR
TELE RN NOTES



RECEIVED T IN BED, AWAKE, A/OX4. PT TOLERATING RA, WITH NO ACUTE RESPIRATORY DISTRESS NOTED. 
ON TELEMONITORING SR 74. PT DENIES ANY PAIN OR DISCOMFORT AS WELL. PT CONCERNED ABOUT 
BENZONATATE TID AT HOME AND FORGOT TO INCLUDE IN HER LIST OF MEDS HERE. IVF  ML/HR TO 
RFA G22, INTACT AND FLUID INFUSING WELL. PT KEPT COMFORTABLE IN BED. CALL LIGHT KEPT WITHIN 
REACH. PT'S BED IN LOWEST, LOCKED POSITION WITH SRX3. WILL CONTINUE PLAN OF CARE.

## 2020-08-18 NOTE — NUR
TELE RN NOTES



INFORMED HOSPITALIST/SA REGARDING MED RECON. PT MADE AWARE AS WELL. AWAITING FOR MED RECON 
TO BE RECONCILED. WILL CONTINUE TO MONITOR.

## 2020-08-18 NOTE — NUR
MS RN NOTES



PT REMAINS IN BED, AWAKE, A/OX4. PT TOLERATING RA, WITH NO ACUTE RESPIRATORY DISTRESS NOTED 
PT DENIES ANY PAIN OR DISCOMFORT AS WELL. IVF  ML/HR TO RFA G22, INTACT AND FLUID 
INFUSING WELL. ALL NEEDS AND CARE ATTENDED. PT KEPT COMFORTABLE IN BED. CALL LIGHT KEPT 
WITHIN REACH. PT'S BED IN LOWEST, LOCKED POSITION WITH SRX3. WILL ENDORSE TO INCOMING NIGHT 
NURSE FOR ELENI.

## 2020-08-18 NOTE — NUR
MS/RN OPENING NOTES 



RECEIVED IN BED RESTING WATCHING TV. PATIENT IS ALERT AND ORIENTED X 4. PATIENT IS ON ROOM 
AIR TOLERATING WELL, 95%. NO SINGS OF SOB OR RESPIRATORY DISTRESS NOTED. BREATHING IS EVEN 
AND UNLABORED. PATIENT HAS LEWIS CATH IN PLACE DRAINING CLEAR YELLOW URINE. IV ACCESS ON 
RIGHT FORE ARM #22 G RUNNING NS  ML/HR. PATIENT HAS RIGHT ANKLE SPLINT IN PLACE, 
DRESSING CLEAN AND INTACT EXTREMITY CIRCULATION GOOD. SAFETY MEASURES ARE IN PLACE, BED IS 
LOCKED AND PLACED IN THE LOWEST POSITION, SIDE RAILS UP X2. CALL LIGHT IS WITHIN REACH. WILL 
CONTINUE TO MONITOR PATIENT THROUGH OUT SHIFT.

## 2020-08-18 NOTE — NUR
MS/RN NOTES 



PATIENT STATED PAIN AT RIGHT LOWER EXTREMITY, REQUESTING FOR MEDICATION TO HELP. V/S ARE 
WITHIN NORMAL LIMITS. NORCO 5 MG PO WAS GIVEN. WILL CONTINUE TO MONITOR.

## 2020-08-18 NOTE — NUR
RN NOTES



ALL NEEDS ATTENDED AND MET ABLE TO REST AND SLEPT AT INTERVALS, REPOSITIONED FOR COMFORT, RE 
INSERTED A NEW PERIPHERAL IV ACCESS INTACT AND PATENT ON HER RIGHT FOREARM. DENIES ANY PAIN 
OR DISCOMFORT AT THIS TIME, SAFETY MEASURES IN PLACE, ASPIRATION PRECAUTION EMPHASIZED, 
RIGHT ANKLE SPLINT IN PLACED, CALL LIGHT WITHIN EASY REACH, ENDORSED TO AM NURSE FOR 
CONTINUITY OF CARE.

## 2020-08-19 VITALS — DIASTOLIC BLOOD PRESSURE: 84 MMHG | SYSTOLIC BLOOD PRESSURE: 136 MMHG

## 2020-08-19 VITALS — SYSTOLIC BLOOD PRESSURE: 133 MMHG | DIASTOLIC BLOOD PRESSURE: 97 MMHG

## 2020-08-19 LAB
ALBUMIN SERPL BCP-MCNC: 2.7 G/DL (ref 3.4–5)
ALBUMIN SERPL ELPH-MCNC: 2.9 G/DL (ref 2.9–4.4)
ALBUMIN/GLOB SERPL: 1 {RATIO} (ref 0.7–1.7)
ALP SERPL-CCNC: 50 U/L (ref 46–116)
ALPHA1 GLOB SERPL ELPH-MCNC: 0.3 G/DL (ref 0–0.4)
ALPHA2 GLOB SERPL ELPH-MCNC: 0.8 G/DL (ref 0.4–1)
ALT SERPL W P-5'-P-CCNC: 18 U/L (ref 12–78)
AST SERPL W P-5'-P-CCNC: 14 U/L (ref 15–37)
B-GLOBULIN SERPL ELPH-MCNC: 1 G/DL (ref 0.7–1.3)
BASOPHILS # BLD AUTO: 0 /CMM (ref 0–0.2)
BASOPHILS NFR BLD AUTO: 0.8 % (ref 0–2)
BILIRUB SERPL-MCNC: 0.3 MG/DL (ref 0.2–1)
BUN SERPL-MCNC: 31 MG/DL (ref 7–18)
CALCIUM SERPL-MCNC: 8.5 MG/DL (ref 8.5–10.1)
CHLORIDE SERPL-SCNC: 110 MMOL/L (ref 98–107)
CO2 SERPL-SCNC: 21 MMOL/L (ref 21–32)
CREAT SERPL-MCNC: 1.3 MG/DL (ref 0.6–1.3)
EOSINOPHIL NFR BLD AUTO: 3.7 % (ref 0–6)
GAMMA GLOB SERPL ELPH-MCNC: 0.9 G/DL (ref 0.4–1.8)
GLOBULIN SER CALC-MCNC: 2.9 G/DL (ref 2.2–3.9)
GLUCOSE SERPL-MCNC: 91 MG/DL (ref 74–106)
HCT VFR BLD AUTO: 31 % (ref 33–45)
HGB BLD-MCNC: 10 G/DL (ref 11.5–14.8)
LYMPHOCYTES NFR BLD AUTO: 1.6 /CMM (ref 0.8–4.8)
LYMPHOCYTES NFR BLD AUTO: 24.6 % (ref 20–44)
MAGNESIUM SERPL-MCNC: 1.9 MG/DL (ref 1.8–2.4)
MCHC RBC AUTO-ENTMCNC: 33 G/DL (ref 31–36)
MCV RBC AUTO: 94 FL (ref 82–100)
MONOCYTES NFR BLD AUTO: 0.5 /CMM (ref 0.1–1.3)
MONOCYTES NFR BLD AUTO: 8.2 % (ref 2–12)
NEUTROPHILS # BLD AUTO: 4.1 /CMM (ref 1.8–8.9)
NEUTROPHILS NFR BLD AUTO: 62.7 % (ref 43–81)
PHOSPHATE SERPL-MCNC: 3 MG/DL (ref 2.5–4.9)
PLATELET # BLD AUTO: 189 /CMM (ref 150–450)
POTASSIUM SERPL-SCNC: 3.6 MMOL/L (ref 3.5–5.1)
PROT SERPL-MCNC: 5.8 G/DL (ref 6.4–8.2)
PTH-INTACT SERPL-MCNC: 38 PG/ML (ref 15–65)
RBC # BLD AUTO: 3.27 MIL/UL (ref 4–5.2)
SODIUM SERPL-SCNC: 141 MMOL/L (ref 136–145)
WBC NRBC COR # BLD AUTO: 6.5 K/UL (ref 4.3–11)

## 2020-08-19 RX ADMIN — Medication SCH MG: at 13:30

## 2020-08-19 RX ADMIN — Medication PRN EACH: at 04:01

## 2020-08-19 RX ADMIN — Medication PRN EACH: at 13:09

## 2020-08-19 RX ADMIN — BENZONATATE SCH MG: 100 CAPSULE ORAL at 08:54

## 2020-08-19 RX ADMIN — SODIUM CHLORIDE PRN MLS/HR: 9 INJECTION, SOLUTION INTRAVENOUS at 00:05

## 2020-08-19 RX ADMIN — GABAPENTIN SCH MG: 300 CAPSULE ORAL at 08:54

## 2020-08-19 RX ADMIN — BACLOFEN SCH MG: 10 TABLET ORAL at 08:54

## 2020-08-19 RX ADMIN — CLONIDINE HYDROCHLORIDE SCH MG: 0.1 TABLET ORAL at 08:54

## 2020-08-19 RX ADMIN — Medication SCH MG: at 07:35

## 2020-08-19 RX ADMIN — BENZONATATE SCH MG: 100 CAPSULE ORAL at 13:09

## 2020-08-19 RX ADMIN — ESCITALOPRAM OXALATE SCH MG: 10 TABLET, FILM COATED ORAL at 08:53

## 2020-08-19 RX ADMIN — Medication SCH MG: at 01:48

## 2020-08-19 RX ADMIN — LABETALOL HCL SCH MG: 100 TABLET, FILM COATED ORAL at 08:53

## 2020-08-19 NOTE — NUR
MS/RN CLOSING NOTES 



PATIENT IS RESTING IN BED. PATIENT IS ALERT AND ORIENTED X 4. PATIENT IS ON ROOM AIR 
TOLERATING WELL, 98%. NO SINGS OF SOB OR RESPIRATORY DISTRESS NOTED. BREATHING IS EVEN AND 
UNLABORED. PATIENT HAS LEWIS CATH IN PLACE DRAINING CLEAR YELLOW URINE OUTPUT 550CC. IV 
ACCESS ON RIGHT FORE ARM #22 G RUNNING NS  ML/HR. PATIENT HAS RIGHT ANKLE SPLINT IN 
PLACE, DRESSING CLEAN AND INTACT EXTREMITY CIRCULATION GOOD. ALL PATIENTS NEEDS HAVE BEEN 
MET DURING SHIFT. SAFETY MEASURES ARE IN PLACE, BED IS LOCKED AND PLACED IN THE LOWEST 
POSITION, SIDE RAILS UP X2. CALL LIGHT IS WITHIN REACH. WILL ENDORSE CARE TO DAY SHIFT.

## 2020-08-19 NOTE — NUR
PT REFUSED RESP TX ATT, STATED SHE IS GOING HOME SOON. NO S/S OF SOB NOTED. BENEFITS AND 
CONTRAINDICATIONS EXPLAINED. WILL CONT TO MONITOR

-------------------------------------------------------------------------------

Addendum: 08/19/20 at 1433 by ALESIA BELL RT

-------------------------------------------------------------------------------

Amended: Links added.

## 2020-08-19 NOTE — NUR
PT REFUSED RESP TX ATT. NO S/S OF SOB NOTED. BENEFITS AND CONTRAINDICATIONS EXPLAINED. WILL 
CONT TO MONITOR

-------------------------------------------------------------------------------

Addendum: 08/19/20 at 0752 by ALESIA BELL RT

-------------------------------------------------------------------------------

Amended: Links added.

## 2020-08-19 NOTE — NUR
MS RN NOTES



RECEIVED PATIENT IN BED, ASLEEP. AROUSBALE TO VERBAL AND TACTILE STIMULI. HOB ELEVATED. 
ALERT AND ORIENTED X4. NO SOB. DENIES ANY C/O PAIN NOR DISCOMFORT AT THIS TIME. RIGHT LOWER 
LEG DRESSING IN PLACE. PATIENT ABLE TO MOVE TOES. SKIN WARM TO TOUCH WITHOUT C/O PAIN NOR 
DISCOMFORT ON RT LOWER LEG. RIGHT FA # 22 INTACT AND PATENT INFUSING NS  ML/HR KAILEY 
WELL. BED IN LOWEST POSITION, LOCKED. BED ALARM ON. CALL LIGHT WITHIN REACH. ABLE TO 
VERBALIZE NEEDS.

## 2020-08-19 NOTE — NUR
MS RN NOTES



PATIENT FOR DISCHARGE. DISCHARGE INSTRUCTIONS AND PACKET GIVEN TO PATIENT. IV ACCESS REMOVED 
WITH CATHETER TIP INTACT. ALL BELONGINGS ACCOUNTED FOR. PER CM, [facility] WILL LOOK FOR HOME 
HEALTH AND PATIENT IS AWARE. REITERATED TO PATIENT NON-WEIGHT BEARING TO RT LEG.  NO S/S OF 
RESPIRATORY DISTRESS. DENIES ANY C/O PAIN NOR DISCOMFORT AT THIS TIME. PATIENT PICKED UP BY 
AMBULANCE ACCOMPANIED BY 2 EMT. PATIENT LEFT IN STABLE CONDITION.

## 2021-03-19 ENCOUNTER — HOSPITAL ENCOUNTER (EMERGENCY)
Dept: HOSPITAL 54 - ER | Age: 56
Discharge: HOME | End: 2021-03-19
Payer: COMMERCIAL

## 2021-03-19 VITALS — WEIGHT: 198 LBS | HEIGHT: 71 IN | BODY MASS INDEX: 27.72 KG/M2

## 2021-03-19 VITALS — SYSTOLIC BLOOD PRESSURE: 102 MMHG | DIASTOLIC BLOOD PRESSURE: 66 MMHG

## 2021-03-19 DIAGNOSIS — F17.200: ICD-10-CM

## 2021-03-19 DIAGNOSIS — Z79.899: ICD-10-CM

## 2021-03-19 DIAGNOSIS — L02.415: Primary | ICD-10-CM

## 2021-03-19 DIAGNOSIS — M19.90: ICD-10-CM

## 2021-03-19 DIAGNOSIS — I10: ICD-10-CM

## 2021-03-19 NOTE — NUR
[id number] home c/o groin pain x today. c/o dizziness pta. low b/p per ems. 
patient a/ox4, breathing even and unlabored, no sob noted, needs attended. Kept 
comfortable in bed.

## 2021-09-06 ENCOUNTER — HOSPITAL ENCOUNTER (EMERGENCY)
Dept: HOSPITAL 54 - ER | Age: 56
Discharge: HOME | End: 2021-09-06
Payer: COMMERCIAL

## 2021-09-06 VITALS — BODY MASS INDEX: 28 KG/M2 | WEIGHT: 200 LBS | HEIGHT: 71 IN

## 2021-09-06 VITALS — DIASTOLIC BLOOD PRESSURE: 77 MMHG | SYSTOLIC BLOOD PRESSURE: 109 MMHG
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
## 2021-09-06 DIAGNOSIS — Z79.899: ICD-10-CM
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
## 2021-09-06 DIAGNOSIS — F17.200: ICD-10-CM
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
## 2021-09-06 DIAGNOSIS — I10: ICD-10-CM
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
## 2021-09-06 DIAGNOSIS — Z98.890: ICD-10-CM

## 2021-09-06 DIAGNOSIS — S13.4XXA: Primary | ICD-10-CM

## 2021-09-06 DIAGNOSIS — J44.9: ICD-10-CM

## 2021-09-06 DIAGNOSIS — Z60.2: ICD-10-CM

## 2021-09-06 SDOH — SOCIAL STABILITY - SOCIAL INSECURITY: PROBLEMS RELATED TO LIVING ALONE: Z60.2

## 2021-09-10 ENCOUNTER — HOSPITAL ENCOUNTER (EMERGENCY)
Dept: HOSPITAL 54 - ER | Age: 56
Discharge: ANOTHER HOSPITAL | End: 2021-09-10
Payer: COMMERCIAL

## 2021-09-10 VITALS — HEIGHT: 68 IN | BODY MASS INDEX: 25.01 KG/M2 | WEIGHT: 165 LBS

## 2021-09-10 VITALS — SYSTOLIC BLOOD PRESSURE: 126 MMHG | DIASTOLIC BLOOD PRESSURE: 89 MMHG

## 2021-09-10 DIAGNOSIS — I10: ICD-10-CM

## 2021-09-10 DIAGNOSIS — R94.31: ICD-10-CM

## 2021-09-10 DIAGNOSIS — R41.82: ICD-10-CM

## 2021-09-10 DIAGNOSIS — M62.82: Primary | ICD-10-CM

## 2021-09-10 DIAGNOSIS — D72.829: ICD-10-CM

## 2021-09-10 DIAGNOSIS — N17.9: ICD-10-CM

## 2021-09-10 DIAGNOSIS — Z20.822: ICD-10-CM

## 2021-09-10 DIAGNOSIS — J44.9: ICD-10-CM

## 2021-09-10 DIAGNOSIS — R00.0: ICD-10-CM

## 2021-09-10 DIAGNOSIS — E87.0: ICD-10-CM

## 2021-09-10 DIAGNOSIS — M17.0: ICD-10-CM

## 2021-09-10 DIAGNOSIS — F14.10: ICD-10-CM

## 2021-09-10 LAB
ALBUMIN SERPL BCP-MCNC: 3.5 G/DL (ref 3.4–5)
ALP SERPL-CCNC: 72 U/L (ref 46–116)
ALT SERPL W P-5'-P-CCNC: 27 U/L (ref 12–78)
APAP SERPL-MCNC: 0 UG/ML (ref 10–30)
AST SERPL W P-5'-P-CCNC: 63 U/L (ref 15–37)
BASOPHILS # BLD AUTO: 0.1 K/UL (ref 0–0.2)
BASOPHILS NFR BLD AUTO: 0.4 % (ref 0–2)
BILIRUB DIRECT SERPL-MCNC: 0.1 MG/DL (ref 0–0.2)
BILIRUB SERPL-MCNC: 0.3 MG/DL (ref 0.2–1)
BILIRUB UR QL STRIP: (no result)
BUN SERPL-MCNC: 62 MG/DL (ref 7–18)
CALCIUM SERPL-MCNC: 10.2 MG/DL (ref 8.5–10.1)
CHLORIDE SERPL-SCNC: 117 MMOL/L (ref 98–107)
CO2 SERPL-SCNC: 19 MMOL/L (ref 21–32)
COLOR UR: YELLOW
CREAT SERPL-MCNC: 2.4 MG/DL (ref 0.6–1.3)
EOSINOPHIL NFR BLD AUTO: 0 % (ref 0–6)
ETHANOL SERPL-MCNC: < 3 MG/DL (ref 0–0)
GLUCOSE SERPL-MCNC: 121 MG/DL (ref 74–106)
HCT VFR BLD AUTO: 43 % (ref 33–45)
HGB BLD-MCNC: 14.2 G/DL (ref 11.5–14.8)
LYMPHOCYTES NFR BLD AUTO: 0.8 K/UL (ref 0.8–4.8)
LYMPHOCYTES NFR BLD AUTO: 4.1 % (ref 20–44)
MCHC RBC AUTO-ENTMCNC: 33 G/DL (ref 31–36)
MCV RBC AUTO: 93 FL (ref 82–100)
MONOCYTES NFR BLD AUTO: 1.6 K/UL (ref 0.1–1.3)
MONOCYTES NFR BLD AUTO: 8.2 % (ref 2–12)
NEUTROPHILS # BLD AUTO: 16.9 K/UL (ref 1.8–8.9)
NEUTROPHILS NFR BLD AUTO: 87.3 % (ref 43–81)
PH UR STRIP: 5.5 [PH] (ref 5–8)
PLATELET # BLD AUTO: 476 K/UL (ref 150–450)
POTASSIUM SERPL-SCNC: 3.7 MMOL/L (ref 3.5–5.1)
PROT SERPL-MCNC: 8.5 G/DL (ref 6.4–8.2)
PROT UR QL STRIP: 100 MG/DL
RBC # BLD AUTO: 4.6 MIL/UL (ref 4–5.2)
RBC #/AREA URNS HPF: (no result) /HPF (ref 0–2)
SODIUM SERPL-SCNC: 155 MMOL/L (ref 136–145)
UROBILINOGEN UR STRIP-MCNC: 0.2 EU/DL
WBC #/AREA URNS HPF: (no result) /HPF (ref 0–3)
WBC NRBC COR # BLD AUTO: 19.3 K/UL (ref 4.3–11)

## 2021-09-10 PROCEDURE — 82550 ASSAY OF CK (CPK): CPT

## 2021-09-10 PROCEDURE — 82962 GLUCOSE BLOOD TEST: CPT

## 2021-09-10 PROCEDURE — 70450 CT HEAD/BRAIN W/O DYE: CPT

## 2021-09-10 PROCEDURE — 81001 URINALYSIS AUTO W/SCOPE: CPT

## 2021-09-10 PROCEDURE — 96372 THER/PROPH/DIAG INJ SC/IM: CPT

## 2021-09-10 PROCEDURE — 87426 SARSCOV CORONAVIRUS AG IA: CPT

## 2021-09-10 PROCEDURE — 71045 X-RAY EXAM CHEST 1 VIEW: CPT

## 2021-09-10 PROCEDURE — 93005 ELECTROCARDIOGRAM TRACING: CPT

## 2021-09-10 PROCEDURE — 80076 HEPATIC FUNCTION PANEL: CPT

## 2021-09-10 PROCEDURE — 80143 DRUG ASSAY ACETAMINOPHEN: CPT

## 2021-09-10 PROCEDURE — 80320 DRUG SCREEN QUANTALCOHOLS: CPT

## 2021-09-10 PROCEDURE — 96360 HYDRATION IV INFUSION INIT: CPT

## 2021-09-10 PROCEDURE — 36415 COLL VENOUS BLD VENIPUNCTURE: CPT

## 2021-09-10 PROCEDURE — C9803 HOPD COVID-19 SPEC COLLECT: HCPCS

## 2021-09-10 PROCEDURE — 82553 CREATINE MB FRACTION: CPT

## 2021-09-10 PROCEDURE — 99291 CRITICAL CARE FIRST HOUR: CPT

## 2021-09-10 PROCEDURE — 80048 BASIC METABOLIC PNL TOTAL CA: CPT

## 2021-09-10 PROCEDURE — 80307 DRUG TEST PRSMV CHEM ANLYZR: CPT

## 2021-09-10 PROCEDURE — G0480 DRUG TEST DEF 1-7 CLASSES: HCPCS

## 2021-09-10 PROCEDURE — 85025 COMPLETE CBC W/AUTO DIFF WBC: CPT

## 2021-09-10 NOTE — NUR
CALL [facility] TO ARRANGE TRANSPORT . AUTH NUMBER# 
[id number]

## 2021-09-10 NOTE — NUR
PATIENT BIBRA NEIGHBOR CALLED AND SAID PT WAS CONSTANTLY SCREAMING FOR 1 DAY

PRIOR TO CALLING, "THEY BELIEVE SHE WAS ON DRUGS". PATIENT IS A/O TO PAIN 
STIMULI AND TOUCH. PATIENT BREATHING IS EVEN, NO SOB NOTED. PATIENT CONNECTED 
TO CARDIAC MONITOR AND POX.

## 2021-09-10 NOTE — NUR
PT IS ACCEPTED AT [facility] UNDER THE CARE PF DR. HUTCHINSON. 
PT IS GOING TO ROOM 5539. CALL  FOR NURSE TO NURSE REPORT.

## 2021-09-10 NOTE — NUR
[facility] AT BEDSIDE FOR PT TRANSPORT TO [facility]. REPORT GIVEN TO CLARISSA MURCIA. NAD NOTED

## 2021-09-10 NOTE — NUR
@ 1140 pt in recieved in bed yelling "aww" non stop yelling and altered. CT 
still not done done p/t keep moving while ct is being taken. MD made aware and 
received an order to give pt Ativan IM and bring to ct once pt calms down. Pt 
is now medicated and awaiting for med effect.

## 2022-11-11 ENCOUNTER — HOSPITAL ENCOUNTER (INPATIENT)
Dept: HOSPITAL 54 - ER | Age: 57
LOS: 1 days | Discharge: HOME | DRG: 811 | End: 2022-11-12
Attending: NURSE PRACTITIONER | Admitting: NURSE PRACTITIONER
Payer: COMMERCIAL

## 2022-11-11 VITALS — BODY MASS INDEX: 33.6 KG/M2 | HEIGHT: 71 IN | WEIGHT: 240 LBS

## 2022-11-11 VITALS — DIASTOLIC BLOOD PRESSURE: 85 MMHG | SYSTOLIC BLOOD PRESSURE: 135 MMHG

## 2022-11-11 DIAGNOSIS — E78.5: ICD-10-CM

## 2022-11-11 DIAGNOSIS — E66.9: ICD-10-CM

## 2022-11-11 DIAGNOSIS — M17.0: ICD-10-CM
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
## 2022-11-11 DIAGNOSIS — F19.11: ICD-10-CM
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
## 2022-11-11 DIAGNOSIS — I50.32: ICD-10-CM

## 2022-11-11 DIAGNOSIS — Z79.51: ICD-10-CM

## 2022-11-11 DIAGNOSIS — I11.0: ICD-10-CM

## 2022-11-11 DIAGNOSIS — Z20.822: ICD-10-CM

## 2022-11-11 DIAGNOSIS — E88.09: ICD-10-CM

## 2022-11-11 DIAGNOSIS — E44.1: ICD-10-CM

## 2022-11-11 DIAGNOSIS — D72.829: ICD-10-CM

## 2022-11-11 DIAGNOSIS — F10.11: ICD-10-CM

## 2022-11-11 DIAGNOSIS — Z79.899: ICD-10-CM

## 2022-11-11 DIAGNOSIS — I25.2: ICD-10-CM

## 2022-11-11 DIAGNOSIS — T38.0X5A: ICD-10-CM

## 2022-11-11 DIAGNOSIS — Z87.891: ICD-10-CM

## 2022-11-11 DIAGNOSIS — Z86.59: ICD-10-CM

## 2022-11-11 DIAGNOSIS — T78.1XXA: Primary | ICD-10-CM

## 2022-11-11 LAB
BASOPHILS # BLD AUTO: 0 K/UL (ref 0–0.2)
BASOPHILS NFR BLD AUTO: 0.4 % (ref 0–2)
BUN SERPL-MCNC: 35 MG/DL (ref 7–18)
CALCIUM SERPL-MCNC: 10.1 MG/DL (ref 8.5–10.1)
CHLORIDE SERPL-SCNC: 103 MMOL/L (ref 98–107)
CO2 SERPL-SCNC: 21 MMOL/L (ref 21–32)
CREAT SERPL-MCNC: 1.1 MG/DL (ref 0.6–1.3)
EOSINOPHIL NFR BLD AUTO: 0.3 % (ref 0–6)
GLUCOSE SERPL-MCNC: 132 MG/DL (ref 74–106)
HCT VFR BLD AUTO: 44 % (ref 33–45)
HGB BLD-MCNC: 14.1 G/DL (ref 11.5–14.8)
LYMPHOCYTES NFR BLD AUTO: 1 K/UL (ref 0.8–4.8)
LYMPHOCYTES NFR BLD AUTO: 7.9 % (ref 20–44)
MCHC RBC AUTO-ENTMCNC: 32 G/DL (ref 31–36)
MCV RBC AUTO: 95 FL (ref 82–100)
MONOCYTES NFR BLD AUTO: 0.3 K/UL (ref 0.1–1.3)
MONOCYTES NFR BLD AUTO: 2.2 % (ref 2–12)
NEUTROPHILS # BLD AUTO: 10.8 K/UL (ref 1.8–8.9)
NEUTROPHILS NFR BLD AUTO: 89.2 % (ref 43–81)
PLATELET # BLD AUTO: 223 K/UL (ref 150–450)
POTASSIUM SERPL-SCNC: 4 MMOL/L (ref 3.5–5.1)
RBC # BLD AUTO: 4.6 MIL/UL (ref 4–5.2)
SODIUM SERPL-SCNC: 137 MMOL/L (ref 136–145)
WBC NRBC COR # BLD AUTO: 12.1 K/UL (ref 4.3–11)

## 2022-11-11 PROCEDURE — C9803 HOPD COVID-19 SPEC COLLECT: HCPCS

## 2022-11-11 PROCEDURE — G0378 HOSPITAL OBSERVATION PER HR: HCPCS

## 2022-11-11 RX ADMIN — Medication SCH MG: at 21:00

## 2022-11-11 RX ADMIN — GABAPENTIN SCH MG: 300 CAPSULE ORAL at 17:19

## 2022-11-11 NOTE — NUR
TELE RN ADMITTING NOTES



PATIENT ARRIVED TO UNIT VIA GURNEY, A/O X 4, ABLE TO MAKE NEEDS KNOWN, TOLERATING WELL ON 
ROOM AIR WITH NO S/S RESPIRATORY DISTRESS. NO COMPLAINTS OF PAIN OR DISCOMFORT. R HAND # 20 
G SL CLEAN, INTACT, AND FLUSHING WELL. SAFETY MEASURES IN PLACE: BED IN LOWEST LOCKED 
POSITION, SIDE RAILS UP X 2, CALL LIGHT WITHIN REACH. TELE MONITOR PLACED ON PATIENT READING 
NSR. WILL CONTINUE TO MONITOR.

## 2022-11-11 NOTE — NUR
Received pt came from [facility]  came by olivia from home [facility] in [facility] since 
yester day after strted new mediction (procardi)

## 2022-11-12 VITALS — DIASTOLIC BLOOD PRESSURE: 92 MMHG | SYSTOLIC BLOOD PRESSURE: 43 MMHG

## 2022-11-12 VITALS — SYSTOLIC BLOOD PRESSURE: 145 MMHG | DIASTOLIC BLOOD PRESSURE: 92 MMHG

## 2022-11-12 LAB
BASOPHILS # BLD AUTO: 0 K/UL (ref 0–0.2)
BASOPHILS NFR BLD AUTO: 0.1 % (ref 0–2)
BUN SERPL-MCNC: 38 MG/DL (ref 7–18)
CALCIUM SERPL-MCNC: 9.4 MG/DL (ref 8.5–10.1)
CHLORIDE SERPL-SCNC: 103 MMOL/L (ref 98–107)
CO2 SERPL-SCNC: 23 MMOL/L (ref 21–32)
CREAT SERPL-MCNC: 1 MG/DL (ref 0.6–1.3)
EOSINOPHIL NFR BLD AUTO: 0.2 % (ref 0–6)
GLUCOSE SERPL-MCNC: 115 MG/DL (ref 74–106)
HCT VFR BLD AUTO: 37 % (ref 33–45)
HGB BLD-MCNC: 12.2 G/DL (ref 11.5–14.8)
LYMPHOCYTES NFR BLD AUTO: 1 K/UL (ref 0.8–4.8)
LYMPHOCYTES NFR BLD AUTO: 8.6 % (ref 20–44)
MAGNESIUM SERPL-MCNC: 2.5 MG/DL (ref 1.8–2.4)
MCHC RBC AUTO-ENTMCNC: 33 G/DL (ref 31–36)
MCV RBC AUTO: 92 FL (ref 82–100)
MONOCYTES NFR BLD AUTO: 1 K/UL (ref 0.1–1.3)
MONOCYTES NFR BLD AUTO: 8.7 % (ref 2–12)
NEUTROPHILS # BLD AUTO: 9.8 K/UL (ref 1.8–8.9)
NEUTROPHILS NFR BLD AUTO: 82.4 % (ref 43–81)
PHOSPHATE SERPL-MCNC: 3.5 MG/DL (ref 2.5–4.9)
PLATELET # BLD AUTO: 235 K/UL (ref 150–450)
POTASSIUM SERPL-SCNC: 3.9 MMOL/L (ref 3.5–5.1)
RBC # BLD AUTO: 4.04 MIL/UL (ref 4–5.2)
SODIUM SERPL-SCNC: 135 MMOL/L (ref 136–145)
TSH SERPL DL<=0.005 MIU/L-ACNC: 0.15 UIU/ML (ref 0.36–3.74)
WBC NRBC COR # BLD AUTO: 12 K/UL (ref 4.3–11)

## 2022-11-12 RX ADMIN — Medication SCH MG: at 02:01

## 2022-11-12 RX ADMIN — GABAPENTIN SCH MG: 300 CAPSULE ORAL at 08:30

## 2022-11-12 RX ADMIN — Medication SCH MG: at 08:11

## 2022-11-12 RX ADMIN — Medication SCH MG: at 13:30

## 2022-11-12 NOTE — NUR
PATIENT SIGNED HER DISCHARGE SUMMARY AND GAVE PAPERWORK TO THE PATIENT.  EXPLAINED ALL THE 
DISCHARGE INSTRUCTIONS TO THE PATIENT AND FOR HER TO PLEASE  HER NEW MEDICATION AND 
TO CONTINUE HER OTHER MEDICATIONS PER MD.  IV LINE WAS DISCONTINUED AND NOTED THE HUB 
INTACT.  ARM BAND AND TELE MONITOR WAS TAKEN OFF OF THE PATIENT.  PATIENT LEFT VIA 
WHEELCHAIR AND LEFT IN NO ACUTE DISTRESS ACCOMPANIED BY JENNIFER DIAS.

## 2022-11-12 NOTE — NUR
RN TELE OPENING NOTES:

RECEIVED PATIENT IN BED ASLEEP BUT EASILY AROUSES TO VOICE AND TACTILE STIMULI.  PATIENT IS 
ALERT, ORIENTED X 4.  NO SOB NOTED, BREATHING EVEN AND UNLABORED WITH OXYGEN SATURATION OF 
95% ON RA.  ON SR ON TELE MONITOR WITH HR OF 75.  IV ACCESS ON LEFT HAND INTACT, PATENT AND 
FLUSHES WELL, NO S/S INFILTRATION NOTED.  NOTED WITH SLIGHT SWELLING ON HER UPPER LIP AT 
THIS TIME, BUT NO SOB NOTED AT THIS TIME.  CALL LIGHT WITHIN REACH.  ALL SAFETY MEASURES IN 
PLACE.  BED LOCKED AND IN LOWEST POSITION.  WILL CONTINUE TO MONITOR PATIENT THROUGHOUT 
SHIFT.

## 2022-11-12 NOTE — NUR
RUSSELL CORONA CAME BY AND VISITED THE PATIENT WITH AN ORDER FOR DISCHARGE AND FOR THE 
PATIENT TO FOLLOW UP WITH HER PCP.  PATIENT IN NO ACUTE DISTRESS AT THIS TIME.

## 2022-11-12 NOTE — NUR
RECEIVED A CALL FROM [facility] YOANNA AND STATED THAT THE PATIENT WILL BE DISCHARGED 
AND SHE CAN LEAVE VIA BUS.  PATIENT DOES NOT NEED A TAP CARD BECAUSE SHE HAS HER OWN.  
PATIENT IS ABLE TO COMMUTE INDEPENDENTLY VIA BUS.

## 2022-11-22 ENCOUNTER — HOSPITAL ENCOUNTER (EMERGENCY)
Dept: HOSPITAL 54 - ER | Age: 57
Discharge: HOME | End: 2022-11-22
Payer: COMMERCIAL

## 2022-11-22 VITALS — DIASTOLIC BLOOD PRESSURE: 77 MMHG | SYSTOLIC BLOOD PRESSURE: 147 MMHG

## 2022-11-22 VITALS — HEIGHT: 67 IN | BODY MASS INDEX: 36.1 KG/M2 | WEIGHT: 230 LBS

## 2022-11-22 DIAGNOSIS — R05.9: ICD-10-CM

## 2022-11-22 DIAGNOSIS — J44.1: Primary | ICD-10-CM

## 2022-11-22 DIAGNOSIS — F17.200: ICD-10-CM

## 2022-11-22 DIAGNOSIS — M17.9: ICD-10-CM

## 2022-11-22 DIAGNOSIS — Z79.899: ICD-10-CM

## 2022-11-22 DIAGNOSIS — Z60.2: ICD-10-CM

## 2022-11-22 DIAGNOSIS — I10: ICD-10-CM

## 2022-11-22 PROCEDURE — 71045 X-RAY EXAM CHEST 1 VIEW: CPT

## 2022-11-22 PROCEDURE — 94640 AIRWAY INHALATION TREATMENT: CPT

## 2022-11-22 PROCEDURE — 93005 ELECTROCARDIOGRAM TRACING: CPT

## 2022-11-22 PROCEDURE — 99285 EMERGENCY DEPT VISIT HI MDM: CPT

## 2022-11-22 SDOH — SOCIAL STABILITY - SOCIAL INSECURITY: PROBLEMS RELATED TO LIVING ALONE: Z60.2

## 2023-04-27 ENCOUNTER — HOSPITAL ENCOUNTER (EMERGENCY)
Dept: HOSPITAL 54 - ER | Age: 58
Discharge: HOME | End: 2023-04-27
Payer: COMMERCIAL

## 2023-04-27 VITALS — BODY MASS INDEX: 33.6 KG/M2 | HEIGHT: 71 IN | WEIGHT: 240 LBS

## 2023-04-27 VITALS — DIASTOLIC BLOOD PRESSURE: 84 MMHG | SYSTOLIC BLOOD PRESSURE: 124 MMHG

## 2023-04-27 DIAGNOSIS — I10: ICD-10-CM

## 2023-04-27 DIAGNOSIS — Z60.2: ICD-10-CM

## 2023-04-27 DIAGNOSIS — Z79.899: ICD-10-CM

## 2023-04-27 DIAGNOSIS — F17.200: ICD-10-CM

## 2023-04-27 DIAGNOSIS — K14.0: Primary | ICD-10-CM

## 2023-04-27 DIAGNOSIS — T78.1XXA: ICD-10-CM
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
## 2023-04-27 LAB
ALBUMIN SERPL BCP-MCNC: 3.4 G/DL (ref 3.4–5)
ALP SERPL-CCNC: 86 U/L (ref 46–116)
ALT SERPL W P-5'-P-CCNC: 24 U/L (ref 12–78)
AST SERPL W P-5'-P-CCNC: 17 U/L (ref 15–37)
BASOPHILS # BLD AUTO: 0.1 K/UL (ref 0–0.2)
BASOPHILS NFR BLD AUTO: 0.8 % (ref 0–2)
BILIRUB DIRECT SERPL-MCNC: 0 MG/DL (ref 0–0.2)
BILIRUB SERPL-MCNC: 0.2 MG/DL (ref 0.2–1)
BUN SERPL-MCNC: 21 MG/DL (ref 7–18)
CALCIUM SERPL-MCNC: 9.5 MG/DL (ref 8.5–10.1)
CHLORIDE SERPL-SCNC: 105 MMOL/L (ref 98–107)
CO2 SERPL-SCNC: 25 MMOL/L (ref 21–32)
CREAT SERPL-MCNC: 1.2 MG/DL (ref 0.6–1.3)
EOSINOPHIL NFR BLD AUTO: 1.6 % (ref 0–6)
GLUCOSE SERPL-MCNC: 125 MG/DL (ref 74–106)
HCT VFR BLD AUTO: 38 % (ref 33–45)
HGB BLD-MCNC: 12.6 G/DL (ref 11.5–14.8)
LYMPHOCYTES NFR BLD AUTO: 1.9 K/UL (ref 0.8–4.8)
LYMPHOCYTES NFR BLD AUTO: 14.5 % (ref 20–44)
MCHC RBC AUTO-ENTMCNC: 33 G/DL (ref 31–36)
MCV RBC AUTO: 92 FL (ref 82–100)
MONOCYTES NFR BLD AUTO: 0.7 K/UL (ref 0.1–1.3)
MONOCYTES NFR BLD AUTO: 5.5 % (ref 2–12)
NEUTROPHILS # BLD AUTO: 9.9 K/UL (ref 1.8–8.9)
NEUTROPHILS NFR BLD AUTO: 77.6 % (ref 43–81)
PLATELET # BLD AUTO: 239 K/UL (ref 150–450)
POTASSIUM SERPL-SCNC: 3.1 MMOL/L (ref 3.5–5.1)
PROT SERPL-MCNC: 6.8 G/DL (ref 6.4–8.2)
RBC # BLD AUTO: 4.18 MIL/UL (ref 4–5.2)
SODIUM SERPL-SCNC: 141 MMOL/L (ref 136–145)
WBC NRBC COR # BLD AUTO: 12.8 K/UL (ref 4.3–11)

## 2023-04-27 PROCEDURE — 96361 HYDRATE IV INFUSION ADD-ON: CPT

## 2023-04-27 PROCEDURE — 80048 BASIC METABOLIC PNL TOTAL CA: CPT

## 2023-04-27 PROCEDURE — 94640 AIRWAY INHALATION TREATMENT: CPT

## 2023-04-27 PROCEDURE — 96375 TX/PRO/DX INJ NEW DRUG ADDON: CPT

## 2023-04-27 PROCEDURE — 80076 HEPATIC FUNCTION PANEL: CPT

## 2023-04-27 PROCEDURE — 96374 THER/PROPH/DIAG INJ IV PUSH: CPT

## 2023-04-27 PROCEDURE — 71045 X-RAY EXAM CHEST 1 VIEW: CPT

## 2023-04-27 PROCEDURE — 99285 EMERGENCY DEPT VISIT HI MDM: CPT

## 2023-04-27 PROCEDURE — 85025 COMPLETE CBC W/AUTO DIFF WBC: CPT

## 2023-04-27 PROCEDURE — 36415 COLL VENOUS BLD VENIPUNCTURE: CPT

## 2023-04-27 SDOH — SOCIAL STABILITY - SOCIAL INSECURITY: PROBLEMS RELATED TO LIVING ALONE: Z60.2

## 2023-04-27 NOTE — NUR
[id number] HOME C/O FACIAL/TONGUE SWELLING UPON WAKING UP AT 10AM. TOOK BENADRIL 
50MG PO, WAS GIVEN EPINEPHRINE PTA. SATTING AT 99% ON ROOM AIR. AWAITING MD HURLEY.